# Patient Record
Sex: FEMALE | Race: WHITE | NOT HISPANIC OR LATINO | Employment: OTHER | URBAN - METROPOLITAN AREA
[De-identification: names, ages, dates, MRNs, and addresses within clinical notes are randomized per-mention and may not be internally consistent; named-entity substitution may affect disease eponyms.]

---

## 2017-11-06 ENCOUNTER — OFFICE VISIT (OUTPATIENT)
Dept: URGENT CARE | Facility: CLINIC | Age: 67
End: 2017-11-06
Payer: MEDICARE

## 2017-11-06 VITALS
TEMPERATURE: 97 F | OXYGEN SATURATION: 98 % | SYSTOLIC BLOOD PRESSURE: 134 MMHG | HEART RATE: 73 BPM | WEIGHT: 157.5 LBS | RESPIRATION RATE: 16 BRPM | HEIGHT: 62 IN | DIASTOLIC BLOOD PRESSURE: 89 MMHG | BODY MASS INDEX: 28.98 KG/M2

## 2017-11-06 DIAGNOSIS — J06.9 UPPER RESPIRATORY TRACT INFECTION, UNSPECIFIED TYPE: Primary | ICD-10-CM

## 2017-11-06 LAB
CTP QC/QA: YES
FLUAV AG NPH QL: NEGATIVE
FLUBV AG NPH QL: NEGATIVE

## 2017-11-06 PROCEDURE — 99213 OFFICE O/P EST LOW 20 MIN: CPT | Mod: S$GLB,,, | Performed by: FAMILY MEDICINE

## 2017-11-06 PROCEDURE — 87804 INFLUENZA ASSAY W/OPTIC: CPT | Mod: 59,QW,S$GLB, | Performed by: FAMILY MEDICINE

## 2017-11-06 NOTE — PROGRESS NOTES
"Subjective:       Patient ID: Ankita Faith is a 67 y.o. female.    Vitals:  height is 5' 2" (1.575 m) and weight is 71.4 kg (157 lb 8 oz). Her oral temperature is 97.2 °F (36.2 °C). Her blood pressure is 134/89 and her pulse is 73. Her respiration is 16 and oxygen saturation is 98%.     Chief Complaint: Sore Throat (Sore throat, body aches and chest congestion since Friday evening)    Sore Throat    This is a new problem. The current episode started in the past 7 days. The problem has been gradually worsening. There has been no fever. Associated symptoms include congestion. Pertinent negatives include no abdominal pain, coughing, ear pain, headaches, hoarse voice or shortness of breath.     Review of Systems   Constitution: Negative for chills, fever and malaise/fatigue.   HENT: Positive for congestion and sore throat. Negative for ear pain and hoarse voice.    Eyes: Negative for discharge and redness.   Cardiovascular: Negative for chest pain, dyspnea on exertion and leg swelling.   Respiratory: Negative for cough, shortness of breath, sputum production and wheezing.    Musculoskeletal: Negative for myalgias.   Gastrointestinal: Negative for abdominal pain and nausea.   Neurological: Negative for headaches.       Objective:      Physical Exam   Constitutional: She appears well-developed and well-nourished. No distress.   HENT:   Right Ear: External ear normal.   Left Ear: External ear normal.   Nose: Mucosal edema and rhinorrhea present. Right sinus exhibits no maxillary sinus tenderness and no frontal sinus tenderness. Left sinus exhibits no maxillary sinus tenderness and no frontal sinus tenderness.   Mouth/Throat: Oropharynx is clear and moist.   Cardiovascular: Normal rate, regular rhythm and normal heart sounds.    Pulmonary/Chest: Effort normal and breath sounds normal. No respiratory distress. She has no wheezes.   Vitals reviewed.      Assessment:       1. Upper respiratory tract infection, unspecified " type        Plan:         Upper respiratory tract infection, unspecified type  -     POCT Influenza A/B      Flu negative

## 2017-11-09 ENCOUNTER — TELEPHONE (OUTPATIENT)
Dept: URGENT CARE | Facility: CLINIC | Age: 67
End: 2017-11-09

## 2019-05-21 PROBLEM — J30.1 CHRONIC SEASONAL ALLERGIC RHINITIS DUE TO POLLEN: Status: ACTIVE | Noted: 2019-05-21

## 2019-05-21 PROBLEM — H40.2234 BILATERAL CHRONIC PRIMARY ANGLE-CLOSURE GLAUCOMA, INDETERMINATE STAGE: Status: ACTIVE | Noted: 2019-05-21

## 2020-09-08 PROBLEM — H25.013 CORTICAL AGE-RELATED CATARACT OF BOTH EYES: Status: ACTIVE | Noted: 2020-09-08

## 2020-10-08 PROBLEM — E66.09 CLASS 1 OBESITY DUE TO EXCESS CALORIES WITH SERIOUS COMORBIDITY AND BODY MASS INDEX (BMI) OF 30.0 TO 30.9 IN ADULT: Status: ACTIVE | Noted: 2020-10-08

## 2021-07-29 PROBLEM — Z91.89 FRAMINGHAM CARDIAC RISK 10-20% IN NEXT 10 YEARS: Status: ACTIVE | Noted: 2021-07-29

## 2021-07-30 ENCOUNTER — PATIENT OUTREACH (OUTPATIENT)
Dept: ADMINISTRATIVE | Facility: HOSPITAL | Age: 71
End: 2021-07-30

## 2022-08-25 PROBLEM — M47.9 OSTEOARTHRITIS OF LOWER BACK: Status: ACTIVE | Noted: 2022-08-25

## 2022-08-25 PROBLEM — B00.1 FEVER BLISTER: Status: ACTIVE | Noted: 2022-08-25

## 2022-09-12 ENCOUNTER — CLINICAL SUPPORT (OUTPATIENT)
Dept: REHABILITATION | Facility: HOSPITAL | Age: 72
End: 2022-09-12
Payer: MEDICARE

## 2022-09-12 DIAGNOSIS — M53.86 DECREASED RANGE OF MOTION OF LUMBAR SPINE: ICD-10-CM

## 2022-09-12 DIAGNOSIS — M54.50 CHRONIC BILATERAL LOW BACK PAIN WITHOUT SCIATICA: ICD-10-CM

## 2022-09-12 DIAGNOSIS — G89.29 CHRONIC BILATERAL LOW BACK PAIN WITHOUT SCIATICA: ICD-10-CM

## 2022-09-12 DIAGNOSIS — M47.9 OSTEOARTHRITIS OF LOWER BACK: ICD-10-CM

## 2022-09-12 PROCEDURE — 97161 PT EVAL LOW COMPLEX 20 MIN: CPT | Mod: PN

## 2022-09-12 PROCEDURE — 97110 THERAPEUTIC EXERCISES: CPT | Mod: PN

## 2022-09-12 NOTE — PLAN OF CARE
[facility]  Physical Therapy Initial Evaluation    Name: Ankita Faith  Clinic Number: [id number]    Therapy Diagnosis:   Encounter Diagnoses   Name Primary?    Osteoarthritis of lower back     Chronic bilateral low back pain without sciatica     Decreased range of motion of lumbar spine      Physician: Eliseo Early*    Physician Orders: PT Eval and Treat   Medical Diagnosis from Referral: Osteoarthritis of lower back  Evaluation Date: 9/12/2022  Authorization Period Expiration: 8/25/23  Plan of Care Expiration: 10/14/22  Visit # / Visits authorized: 1/ 1    Time In: 9:30  Time Out: 10:15  Total Billable Time: 45 minutes    Precautions: Standard    Subjective   Date of onset: a few months, saw MD in 8/2022  History of current condition - Ankita reports: pain to low back in the morning for 3-5 hours and then it eases.  She states she gets up and gets her neighbors paper and her paper, sometimes she plays volleyball.       Medical History:   Past Medical History:   Diagnosis Date    Allergy     Hyperlipidemia     Hypertension     Skin cancer     Vitamin D deficiency        Surgical History:   Ankita Faith  has a past surgical history that includes Tonsillectomy; Rhinoplasty; Breast surgery; Tubal ligation; melenoma; Excision basal cell carcinoma; Hysterectomy (1986); and Oophorectomy (1986).    Medications:   Ankita has a current medication list which includes the following prescription(s): cholecalciferol (vitamin d3), fluticasone propionate, ilevro, latanoprost, losartan, naproxen, pravastatin, and valacyclovir.    Allergies:   Review of patient's allergies indicates:   Allergen Reactions    Codeine Other (See Comments) and Nausea And Vomiting     Vomiting        Imaging, none: for lumbar in Epic    Prior Therapy: none  Social History: pt lives with their spouse in a 1 story house  Occupation: retired, but works part time (sitting, standing) for [employer]  Prior Level of  Function: I with all ADLs  Current Level of Function: increased difficulty and pain with sit to stand    Sleep: pt sleeps undisturbed  Numbness/tingling: pt denies    Pain:  Current 3/10, worst 6/10, best 0/10   Location:  low back  Description: Aching  Aggravating Factors: AM  Easing Factors: activity    Pts goals: decreased low back pain so she does not have pain in the morning        Objective     Observation: pt is pleasant and cooperative    Posture: slight fwd head, pelvis level     Lumbar Range of Motion:    % Pain   Flexion 90   no        Extension 100   no        Left Side Bending 90 no        Right Side Bending 90 no        Left rotation   75 no        Right Rotation   50 no             Lower Extremity Strength  Right LE  Left LE    Knee extension: 5/5 Knee extension: 5/5   Knee flexion: 5/5 Knee flexion: 5/5   Hip flexion: 5/5 Hip flexion: 5/5   Hip extension:  4+/5 Hip extension: 4+/5   Hip abduction: 4+/5 Hip abduction: 4+/5   Hip adduction: 5/5 Hip adduction 5/5   Ankle dorsiflexion: 5/5 Ankle dorsiflexion: 5/5   Ankle plantarflexion: 5/5 Ankle plantarflexion: 5/5         Special Tests:  -Bridge Test: + with hands together and LE ext    Neuro Dynamic Testing:    Sciatic nerve:      SLR: -        Joint Mobility: hypomobility for PAs to L 3-5    Palpation: tightness to B lumbar paraspinals    Sensation: grossly intact to light touch    Flexibility:     Popliteal Angle: R = -10 degrees ; L = -4 degrees    Marques test: R = - ; L = -    Functional Limitations Reports - G Codes  CMS Impairment/Limitation/Restriction for FOTO Lumbar Spine Survey  Status Limitation G-Code CMS Severity Modifier  Intake 67% 33% Current Status CJ - At least 20 percent but less than 40 percent  Predicted 77% 23% Goal Status+ CJ - At least 20 percent but less than 40 percent      PT Evaluation Completed? Yes  Discussed Plan of Care with patient: Yes    TREATMENT   Treatment Time In: 10:00  Treatment Time Out: 10:15  Total  Treatment time separate from Evaluation: 15 minutes    Ankita received therapeutic exercises to develop strength, ROM, flexibility, and core stabilization for 15 minutes including:  Gently lumbar oscillation x20  LTR 3x20 sec  DKTC 3x20 sec  Piriformis stretch 3x20 sec  Supine hamstring stretch 6x10 sec  TA set x10, 5 sec hold  Bridges with arms across chest x10, 5 sec hold     May add: TA set + sh ext, shuttle + TA set, seated lumbar flexion stretch, squat lift      Home Exercises and Patient Education Provided    Education provided:   - role of PT  -perform stretches in morning to help decreased stiffness  -perform stretches to point of pull, not pain  Pt gave verbal understanding to all education provided     Written Home Exercises Provided: yes.  Exercises were reviewed and Ankita was able to demonstrate them prior to the end of the session.  Ankita demonstrated good  understanding of the education provided.     See EMR under Patient Instructions for exercises provided 9/12/2022.    Assessment   Ankita is a 72 y.o. female referred to outpatient Physical Therapy with a medical diagnosis of Osteoarthritis of lower back. Pt presents with decreased lumbar ROM, tightness to lumbar paraspinals, low back pain in morning and after static positions.    Pt prognosis is Good.   Pt will benefit from skilled outpatient Physical Therapy to address the deficits stated above and in the chart below, provide pt/family education, and to maximize pt's level of independence.     Plan of care discussed with patient: Yes  Pt's spiritual, cultural and educational needs considered and patient is agreeable to the plan of care and goals as stated below:     Anticipated Barriers for therapy: none    Medical Necessity is demonstrated by the following  History  Co-morbidities and personal factors that may impact the plan of care Co-morbidities:   history of cancer and HTN    Personal Factors:   no deficits     high   Examination  Body Structures  and Functions, activity limitations and participation restrictions that may impact the plan of care Body Regions:   back  lower extremities  trunk    Body Systems:    ROM  strength    Participation Restrictions:        Activity limitations:   Learning and applying knowledge  no deficits    General Tasks and Commands  no deficits    Communication  no deficits    Mobility  lifting and carrying objects    Self care  toileting    Domestic Life  no deficits    Interactions/Relationships  no deficits    Life Areas  no deficits    Community and Social Life  community life  recreation and leisure         high   Clinical Presentation stable and uncomplicated low   Decision Making/ Complexity Score: low       GOALS: Long Term Goals:  4 weeks  1.Report decreased    low back    pain  <   / =  2  /10 at its worst to increase tolerance for ADLs in the morning.  2. Pt to demonstrate understanding of body mechanics for lifting object floor to/from waist to prevent exacerbation of symptoms.   3. Increased R LE strength by 1/3 muscle grade in all deficient planes to increase tolerance for ADL and work activities.  4. Increased L LE strength by 1/3 muscle grade in all deficient planes to increase tolerance for ADL and work activities.   5. Pt to tolerate HEP to improve ROM and independence with ADL's        Plan   Plan of care Certification: 9/12/2022 to 10/14/22.    Outpatient Physical Therapy 1 times weekly for 4 weeks to include the following interventions: Electrical Stimulation  , Manual Therapy, Moist Heat/ Ice, Neuromuscular Re-ed, Patient Education, Self Care, Therapeutic Activities, Therapeutic Exercise, and dry needling.     Miri Fletcher, PT

## 2022-09-19 ENCOUNTER — CLINICAL SUPPORT (OUTPATIENT)
Dept: REHABILITATION | Facility: HOSPITAL | Age: 72
End: 2022-09-19
Payer: MEDICARE

## 2022-09-19 DIAGNOSIS — M54.50 CHRONIC BILATERAL LOW BACK PAIN WITHOUT SCIATICA: Primary | ICD-10-CM

## 2022-09-19 DIAGNOSIS — G89.29 CHRONIC BILATERAL LOW BACK PAIN WITHOUT SCIATICA: Primary | ICD-10-CM

## 2022-09-19 DIAGNOSIS — M53.86 DECREASED RANGE OF MOTION OF LUMBAR SPINE: ICD-10-CM

## 2022-09-19 PROCEDURE — 97110 THERAPEUTIC EXERCISES: CPT | Mod: PN

## 2022-09-28 NOTE — PROGRESS NOTES
[facility]   Physical Therapy Treatment Note     Name: Ankita Faith  Clinic Number: [id number]    Therapy Diagnosis:   Encounter Diagnoses   Name Primary?    Chronic bilateral low back pain without sciatica Yes    Decreased range of motion of lumbar spine        Physician: Eliseo Early*    Visit Date: 9/29/2022    Physician Orders: PT Eval and Treat   Medical Diagnosis from Referral: Osteoarthritis of lower back  Evaluation Date: 9/12/2022  Authorization Period Expiration: 10/17/22  Plan of Care Expiration: 10/14/22  Visit # / Visits authorized: 2/ 12    Time In: 8:53  Time Out: 9:30  Total Billable Time: 38 minutes      PTA Visit #: 0/5     Precautions: [place] in R ear    SUBJECTIVE     Pt reports: she is feeling better overall.  She does not have pain today.  She states she was able to play volleyball yesterday without pain.  She was compliant with home exercise program. (1x/day in the evening)  Response to previous treatment: did well  Functional change: decreased pain intensity and duration in the AM, able to play volleyball without pain    Pain: 0/10 presently and 4/10 at its worst  Location:  low back     OBJECTIVE     Objective Measures updated at progress report unless specified.     Treatment     Ankita received the treatments listed below:      therapeutic exercises to develop strength, ROM, flexibility, posture, and core stabilization for 30 minutes including: (PT speaks to pt on L side due to [place] on R)  NP Gently lumbar oscillation x20  LTR 3x20 sec  DKTC 3x20 sec  Piriformis stretch 3x20 sec  Supine hamstring stretch 6x10 sec  TA set x10, 5 sec hold  Bridges with arms across chest x20, 5 sec hold  Bridges + sh ext Y tubing x10  Shuttle + TA set 3 bands 2x10  Seated lumbar flexion stretch, then to ea side x20 sec ea     May add: TA set + sh ext    Therapeutic activities for improved body mechanics with lifting for 8 minutes to include:  Squat taps on 18in box with TA  set x10  Squat lift floor to/from waist 12.5# box x5  Squat lift floor to/from waist 12.5# box, then pivot transfer to 24in box x3 ea direction  Educated on body mechanics for sweeping.  Pt able to demonstrate understandning      Patient Education and Home Exercises     Home Exercises Provided and Patient Education Provided     Education provided:   - perform HEP in AM to help decreased pain before getting out of bed  Pt gave verbal understanding to all education provided     Written Home Exercises Provided: yes. Exercises were reviewed and Ankita was able to demonstrate them prior to the end of the session.  Ankita demonstrated good  understanding of the education provided. See EMR under Patient Instructions for exercises provided during therapy sessions    ASSESSMENT     Pt presented with low back pain, which decreased to 2/10 after performing DKTC and LTR.  Pt instructed to perform the 2 stretches prior to getting out of bed in the morning to self manage symptoms.  She was able to perform TA set with functional motions today.  She reported decreased pain to 0/10 post tx.    Ankita Is progressing well towards her goals.   Pt prognosis is Excellent.     Pt will continue to benefit from skilled outpatient physical therapy to address the deficits listed in the problem list box on initial evaluation, provide pt/family education and to maximize pt's level of independence in the home and community environment.     Pt's spiritual, cultural and educational needs considered and pt agreeable to plan of care and goals.     Anticipated barriers to physical therapy: none    Goals:   Long Term Goals:  4 weeks (progressing, not met)  1.Report decreased    low back    pain  <   / =  2  /10 at its worst to increase tolerance for ADLs in the morning.  2. Pt to demonstrate understanding of body mechanics for lifting object floor to/from waist to prevent exacerbation of symptoms.   3. Increased R LE strength by 1/3 muscle grade in all  deficient planes to increase tolerance for ADL and work activities.  4. Increased L LE strength by 1/3 muscle grade in all deficient planes to increase tolerance for ADL and work activities.   5. Pt to tolerate HEP to improve ROM and independence with ADL's      PLAN     Continue PT towards established goals.  Progress core and glute strengthening, education on body mechanics for lifting.    Plan of care Certification: 9/12/2022 to 10/14/22.    Outpatient Physical Therapy 1 times weekly for 4 weeks to include the following interventions: Electrical Stimulation  , Manual Therapy, Moist Heat/ Ice, Neuromuscular Re-ed, Patient Education, Self Care, Therapeutic Activities, Therapeutic Exercise, and dry needling.       Miri Fletcher, PT

## 2022-09-29 ENCOUNTER — CLINICAL SUPPORT (OUTPATIENT)
Dept: REHABILITATION | Facility: HOSPITAL | Age: 72
End: 2022-09-29
Payer: MEDICARE

## 2022-09-29 DIAGNOSIS — M54.50 CHRONIC BILATERAL LOW BACK PAIN WITHOUT SCIATICA: Primary | ICD-10-CM

## 2022-09-29 DIAGNOSIS — M53.86 DECREASED RANGE OF MOTION OF LUMBAR SPINE: ICD-10-CM

## 2022-09-29 DIAGNOSIS — G89.29 CHRONIC BILATERAL LOW BACK PAIN WITHOUT SCIATICA: Primary | ICD-10-CM

## 2022-09-29 PROCEDURE — 97530 THERAPEUTIC ACTIVITIES: CPT | Mod: PN

## 2022-09-29 PROCEDURE — 97110 THERAPEUTIC EXERCISES: CPT | Mod: PN

## 2022-10-03 ENCOUNTER — CLINICAL SUPPORT (OUTPATIENT)
Dept: REHABILITATION | Facility: HOSPITAL | Age: 72
End: 2022-10-03
Payer: MEDICARE

## 2022-10-03 DIAGNOSIS — M53.86 DECREASED RANGE OF MOTION OF LUMBAR SPINE: ICD-10-CM

## 2022-10-03 DIAGNOSIS — G89.29 CHRONIC BILATERAL LOW BACK PAIN WITHOUT SCIATICA: Primary | ICD-10-CM

## 2022-10-03 DIAGNOSIS — M54.50 CHRONIC BILATERAL LOW BACK PAIN WITHOUT SCIATICA: Primary | ICD-10-CM

## 2022-10-03 PROCEDURE — 97110 THERAPEUTIC EXERCISES: CPT | Mod: PN

## 2022-10-03 NOTE — PLAN OF CARE
[facility]   Discharge Summary and Physical Therapy Treatment Note     Name: Ankita Faith  Clinic Number: [id number]    Therapy Diagnosis:   Encounter Diagnoses   Name Primary?    Chronic bilateral low back pain without sciatica Yes    Decreased range of motion of lumbar spine        Physician: Eliseo Early*    Visit Date: 10/3/2022    Physician Orders: PT Eval and Treat   Medical Diagnosis from Referral: Osteoarthritis of lower back  Evaluation Date: 9/12/2022  Authorization Period Expiration: 10/17/22  Plan of Care Expiration: 10/14/22  Visit # / Visits authorized: 3/ 12    Time In: 9:35  Time Out: 10:20  Total Billable Time: 45 minutes    Cancels: 0  No shows: 0      PTA Visit #: 0/5     Precautions: [place] in R ear    SUBJECTIVE     Pt reports: she is feeling good.  She does not have   She was compliant with home exercise program. (1x/day in the evening)  Response to previous treatment: did well  Functional change: decreased pain intensity and duration in the AM, able to play volleyball without pain    Pain: 0/10 presently and 4/10 at its worst  Location:  low back     OBJECTIVE     Lumbar Range of Motion:    % Pain   Flexion 90   no        Extension 100   no        Left Side Bending 90 no        Right Side Bending 90 no        Left rotation   75 no        Right Rotation   100 no             Lower Extremity Strength  Right LE  Left LE    Knee extension: 5/5 Knee extension: 5/5   Knee flexion: 5/5 Knee flexion: 5/5   Hip flexion: 5/5 Hip flexion: 5/5   Hip extension:  5/5 Hip extension: 5/5   Hip abduction: 5/5 Hip abduction: 5/5   Hip adduction: 5/5 Hip adduction 5/5   Ankle dorsiflexion: 5/5 Ankle dorsiflexion: 5/5   Ankle plantarflexion: 5/5 Ankle plantarflexion: 5/5         Special Tests:  -Bridge Test: + with hands together and LE ext    Neuro Dynamic Testing:    Sciatic nerve:      SLR: -        Joint Mobility: hypomobility for PAs to L 3-5    Palpation: tightness to B  lumbar paraspinals    Sensation: grossly intact to light touch    Flexibility:     Popliteal Angle: R = -5 degrees ; L = -4 degrees    Marques test: R = - ; L = -    CMS Impairment/Limitation/Restriction for FOTO Lumbar Spine Survey  Status Limitation G-Code CMS Severity Modifier  Intake 67% 33%  Predicted 77% 23% Goal Status+ CJ - At least 20 percent but less than 40 percent  9/29/2022 94% 6%  10/3/2022 82% 18%  10/3/2022 94% 6% Current Status CI - At least 1 percent but less than 20 percent      Treatment     Ankita received the treatments listed below:      therapeutic exercises to develop strength, ROM, flexibility, posture, and core stabilization for 43 minutes including: (PT speaks to pt on L side due to [place] on R)  Reassessement  Gently lumbar oscillation x20  LTR 3x20 sec  DKTC 3x20 sec  Piriformis stretch 3x20 sec  Supine hamstring stretch 6x10 sec  TA set x10, 5 sec hold  Bridges with arms across chest x20, 5 sec hold  Shuttle + TA set 3 bands 2x10  Seated lumbar flexion stretch, then to ea side x20 sec ea     Therapeutic activities for improved body mechanics with lifting for 2 minutes to include:  Squat lift floor to/from waist 12.5# box, then pivot transfer to 24in box x5 ea direction        Patient Education and Home Exercises     Home Exercises Provided and Patient Education Provided     Education provided:   - perform HEP in AM to help decreased pain before getting out of bed  -continue HEP upon discharge  Pt gave verbal understanding to all education provided     Written Home Exercises Provided: pt instructed to continue previous HEP. Exercises were reviewed and Ankita was able to demonstrate them prior to the end of the session.  Ankita demonstrated good  understanding of the education provided. See EMR under Patient Instructions for exercises provided during therapy sessions    ASSESSMENT     Pt reassessed today.  She met all goals and is safe for discharge to continue to self manage with HEP.    Pt's  spiritual, cultural and educational needs considered and pt agreeable to plan of care and goals.     Anticipated barriers to physical therapy: none    Goals:   Long Term Goals:  4 weeks (progressing, not met)  1.Report decreased    low back    pain  <   / =  2  /10 at its worst to increase tolerance for ADLs in the morning. (Met)  2. Pt to demonstrate understanding of body mechanics for lifting object floor to/from waist to prevent exacerbation of symptoms.  (Met)  3. Increased R LE strength by 1/3 muscle grade in all deficient planes to increase tolerance for ADL and work activities. (Met)  4. Increased L LE strength by 1/3 muscle grade in all deficient planes to increase tolerance for ADL and work activities. (Met)  5. Pt to tolerate HEP to improve ROM and independence with ADL's (met)      PLAN     Patient discharged from PT to continue to self manage with HEP.      Miri Fletcher, PT